# Patient Record
Sex: MALE | Race: WHITE | NOT HISPANIC OR LATINO | Employment: FULL TIME | URBAN - METROPOLITAN AREA
[De-identification: names, ages, dates, MRNs, and addresses within clinical notes are randomized per-mention and may not be internally consistent; named-entity substitution may affect disease eponyms.]

---

## 2022-11-08 ENCOUNTER — TELEPHONE (OUTPATIENT)
Dept: PHYSICAL MEDICINE AND REHAB | Facility: CLINIC | Age: 47
End: 2022-11-08

## 2022-11-08 NOTE — TELEPHONE ENCOUNTER
[facility]    Phone Message    May a detailed message be left on voicemail: yes     Reason for Call: Other: Pt called to schedule initail assessment with Dr. Lopez to discuss Botox or other possible treatmens for his Dystonia that effect his jaw movement.      Pt has been seen primarily at [facility] and on occasion [facility]. Pt was directed to Dr. Lopez via a support group he attends. He is thinking that Botox may be the answer, but is open to other care.     He did add that per his insurance, the Botox needs PA.    Per protocols, sending for clinic review.    Please review and call Pt back at [phone] to discuss and schedule appropriately.    Action Taken: Message routed to:  [facility] PM[facility]    Travel Screening: Not Applicable

## 2022-11-09 NOTE — TELEPHONE ENCOUNTER
Returned call to pt, offered him 2/7/23 at [place] since 2/10 was taken. Patient agreed and confirmed appt at [place],.    August

## 2022-11-09 NOTE — TELEPHONE ENCOUNTER
[facility]    Phone Message    May a detailed message be left on voicemail: yes     Reason for Call: Other: Pt is calling back returning a call about an appt with Dr. Lopez on 02/10/23 at 9:40 am. Pt says he can do that appt and would like to be scheduled for that date.     Action Taken: Message routed to:  [facility] PM&R    Travel Screening: Not Applicable

## 2022-11-09 NOTE — TELEPHONE ENCOUNTER
Returned a call to patient, had to leave a message, asked him to call back and let us know if he'd like to come in on Feb 10th at 9:40 or next available, as well as tootie first available with Dr Lopez.

## 2022-11-11 ENCOUNTER — TRANSCRIBE ORDERS (OUTPATIENT)
Dept: OTHER | Age: 47
End: 2022-11-11

## 2022-11-11 DIAGNOSIS — G24.4 OROMANDIBULAR DYSTONIA: Primary | ICD-10-CM

## 2023-02-07 ENCOUNTER — OFFICE VISIT (OUTPATIENT)
Dept: PHYSICAL MEDICINE AND REHAB | Facility: CLINIC | Age: 48
End: 2023-02-07
Payer: COMMERCIAL

## 2023-02-07 VITALS — HEART RATE: 61 BPM | SYSTOLIC BLOOD PRESSURE: 130 MMHG | DIASTOLIC BLOOD PRESSURE: 87 MMHG

## 2023-02-07 DIAGNOSIS — G24.4 OROMANDIBULAR DYSTONIA: ICD-10-CM

## 2023-02-07 DIAGNOSIS — G24.4 IDIOPATHIC OROFACIAL DYSTONIA: Primary | ICD-10-CM

## 2023-02-07 PROCEDURE — 99204 OFFICE O/P NEW MOD 45 MIN: CPT | Performed by: PHYSICAL MEDICINE & REHABILITATION

## 2023-02-07 RX ORDER — CLONAZEPAM 1 MG/1
2 TABLET ORAL AT BEDTIME
COMMUNITY
Start: 2022-11-01

## 2023-02-07 NOTE — LETTER
"    2023         RE: Jc Navarrete  [id number] [address] [address]        Dear Colleague,    Thank you for referring your patient, Jc Navarrete, to the [facility]. Please see a copy of my visit note below.        [facility]     PHYSICAL MEDICINE & REHABILITATION EVALUATION      PATIENT NAME Jc Navarrete   1975  MRN [id number]  REFERRING PROVIDER Self-referred    CHIEF COMPLAINT   Involuntary facial/jaw movements    HISTORY OF PRESENT ILLNESS  Jc Navarrete is a 47 year old male with a history of Tourette's syndrome and involuntary facial/jaw movements who presents to clinic for consideration of botulinum toxin injections and other potential modalities for management of the involuntary facial/jaw movements.     The patient states that he has a history of Tourette's syndrome, and developed tics around the age of 7. The tics were described as occasional eye blinking, head rotation, and throat clearing.  He was managed at the [facility] of neurolog[facility], and started on clonidine at one point.  Throughout his life, these tics have become seldom and very well managed to the point where they have not been a significant issue.  He occasionally gets the urge to turn his head, blink his eyes, or \"position things in a room\" with his eyes.     In 2022, the patient insidiously started to grind his front teeth together, which slowly became more frequent over time.  He then noticed that the teeth grinding was occurring more frequently during the day and night, which caused fatigue of his jaw musculature.  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 A few months later, however, his mandible started involuntarily pulling to the right side, and was " "associated with pain and clicking on the right side.  Symptoms progressed to the point that they are now where his mandible coni to the right at all times.  He must \"use half of my arm strength\" to hold the mandible in place with eating and talking.  If he does not hold his mandible in place with speaking, his speech is impaired with a lisp. This has become so tiring that he no longer does anything socially. He denies any significant life changes, stressors, or health changes around the time his symptoms developed - he notes that he did go through a traumatic divorce in 2015, but that he worked through this years ago.     He went to the dentist recently and was told that his front teeth have become flattened due to teeth grinding, and he also chipped part of his left front tooth from the grinding.  It was suggested that he use a mouthguard, however the patient had already tried an over-the-counter mouthguard which made him drool so much that he was not able to sleep.  He is reluctant to spend a significant amount of money on a custom mouthguard out of concern that he likely will not be able to tolerate it.      PAST MEDICAL HISTORY  Tourette's  Oromandibular dystonia    PAST SURGICAL HISTORY  No past surgical history on file.    PAST SOCIAL HISTORY  Social History     Socioeconomic History     Marital status: Single     Spouse name: Not on file     Number of children: Not on file     Years of education: Not on file     Highest education level: Not on file   Occupational History     Not on file   Tobacco Use     Smoking status: Never     Smokeless tobacco: Never   Substance and Sexual Activity     Alcohol use: Not on file     Drug use: Not on file     Sexual activity: Not on file   Other Topics Concern     Not on file   Social History Narrative     Not on file     Social Determinants of Health     Financial Resource Strain: Not on file   Food Insecurity: Not on file   Transportation Needs: Not on file   Physical " Activity: Not on file   Stress: Not on file   Social Connections: Not on file   Intimate Partner Violence: Not on file   Housing Stability: Not on file       FAMILY HISTORY  Daughter has a history of Tourette's syndrome      IMMUNIZATIONS    There is no immunization history on file for this patient.    ALLERGIES  Allergies   Allergen Reactions     Doxycycline Rash       MEDICATIONS  Current Outpatient Medications   Medication     clonazePAM (KLONOPIN) 1 MG tablet     No current facility-administered medications for this visit.       PHYSICAL EXAM                         There is no height or weight on file to calculate BMI.  GEN: Pleasant and cooperative, in no acute distress  HEENT: There is involuntary shearing of the mandible to the right, with palpable tension in the right masseter and right lateral pterygoid (likely compensatory).  Front incisors have lost the normal curvature, and there is a small chip on the left front incisor.      ASSESSMENT AND PLAN  Jc Navarrete is a 47 year old male with a history of Tourette's syndrome and involuntary facial/jaw movements who presents to clinic for consideration of botulinum toxin injections and other potential modalities for management of the involuntary facial/jaw movements.     The patient primarily exhibits involuntary mandibular shearing to the right, which is likely primarily due to activation of the left lateral pterygoid. The pain on the right side of his jaw is likely in the right lateral pterygoid and possibly masseters as compensatory response. EMG activity will be confirmatory.    The patient is an excellent candidate for a trial of botulinum toxin injections for management of oromandibular dystonia, which is gold standard treatment of choice for this condition.     Plan will be to request prior authorization from insurer for a maximum dose of 200 units of Botox every 12 weeks for the management of idiopathic oromandibular dystonia. He has been tentatively  scheduled for first series of injections pending insurance approval.       Thank you for this consultation. Please do not hesitate to contact me with further questions.   Johanna Lopez MD  Physical Medicine and Rehabilitation  [phone]      I spent a total of 46 minutes face-to-face and managing the care of Jc Navarrete. Over 50% of my time was spent counseling the patient and coordinating care. Please see note for details.                   Again, thank you for allowing me to participate in the care of your patient.        Sincerely,        Johanna Lopez MD

## 2023-02-07 NOTE — PROGRESS NOTES
"    [facility]     PHYSICAL MEDICINE & REHABILITATION EVALUATION      PATIENT NAME Jc Navarrete   1975  MRN [id number]  REFERRING PROVIDER Self-referred    CHIEF COMPLAINT   Involuntary facial/jaw movements    HISTORY OF PRESENT ILLNESS  Jc Navarrete is a 47 year old male with a history of Tourette's syndrome and involuntary facial/jaw movements who presents to clinic for consideration of botulinum toxin injections and other potential modalities for management of the involuntary facial/jaw movements.     The patient states that he has a history of Tourette's syndrome, and developed tics around the age of 7. The tics were described as occasional eye blinking, head rotation, and throat clearing.  He was managed at the [facility], and started on clonidine at one point.  Throughout his life, these tics have become seldom and very well managed to the point where they have not been a significant issue.  He occasionally gets the urge to turn his head, blink his eyes, or \"position things in a room\" with his eyes.     In 2022, the patient insidiously started to grind his front teeth together, which slowly became more frequent over time.  He then noticed that the teeth grinding was occurring more frequently during the day and night, which caused fatigue of his jaw musculature.  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 A few months later, however, his mandible started involuntarily pulling to the right side, and was associated with pain and clicking on the right side.  Symptoms progressed to the point that they are now where his mandible coni to the right at all times.  He must \"use half of my arm strength\" to hold the mandible in place with eating and talking.  If he does " not hold his mandible in place with speaking, his speech is impaired with a lisp. This has become so tiring that he no longer does anything socially. He denies any significant life changes, stressors, or health changes around the time his symptoms developed - he notes that he did go through a traumatic divorce in 2015, but that he worked through this years ago.     He went to the dentist recently and was told that his front teeth have become flattened due to teeth grinding, and he also chipped part of his left front tooth from the grinding.  It was suggested that he use a mouthguard, however the patient had already tried an over-the-counter mouthguard which made him drool so much that he was not able to sleep.  He is reluctant to spend a significant amount of money on a custom mouthguard out of concern that he likely will not be able to tolerate it.      PAST MEDICAL HISTORY  Tourette's  Oromandibular dystonia    PAST SURGICAL HISTORY  No past surgical history on file.    PAST SOCIAL HISTORY  Social History     Socioeconomic History     Marital status: Single     Spouse name: Not on file     Number of children: Not on file     Years of education: Not on file     Highest education level: Not on file   Occupational History     Not on file   Tobacco Use     Smoking status: Never     Smokeless tobacco: Never   Substance and Sexual Activity     Alcohol use: Not on file     Drug use: Not on file     Sexual activity: Not on file   Other Topics Concern     Not on file   Social History Narrative     Not on file     Social Determinants of Health     Financial Resource Strain: Not on file   Food Insecurity: Not on file   Transportation Needs: Not on file   Physical Activity: Not on file   Stress: Not on file   Social Connections: Not on file   Intimate Partner Violence: Not on file   Housing Stability: Not on file       FAMILY HISTORY  Daughter has a history of Tourette's syndrome      IMMUNIZATIONS    There is no immunization  history on file for this patient.    ALLERGIES  Allergies   Allergen Reactions     Doxycycline Rash       MEDICATIONS  Current Outpatient Medications   Medication     clonazePAM (KLONOPIN) 1 MG tablet     No current facility-administered medications for this visit.       PHYSICAL EXAM                         There is no height or weight on file to calculate BMI.  GEN: Pleasant and cooperative, in no acute distress  HEENT: There is involuntary shearing of the mandible to the right, with palpable tension in the right masseter and right lateral pterygoid (likely compensatory).  Front incisors have lost the normal curvature, and there is a small chip on the left front incisor.      ASSESSMENT AND PLAN  Jc Navarrete is a 47 year old male with a history of Tourette's syndrome and involuntary facial/jaw movements who presents to clinic for consideration of botulinum toxin injections and other potential modalities for management of the involuntary facial/jaw movements.     The patient primarily exhibits involuntary mandibular shearing to the right, which is likely primarily due to activation of the left lateral pterygoid. The pain on the right side of his jaw is likely in the right lateral pterygoid and possibly masseters as compensatory response. EMG activity will be confirmatory.    The patient is an excellent candidate for a trial of botulinum toxin injections for management of oromandibular dystonia, which is gold standard treatment of choice for this condition.     Plan will be to request prior authorization from insurer for a maximum dose of 200 units of Botox every 12 weeks for the management of idiopathic oromandibular dystonia. He has been tentatively scheduled for first series of injections pending insurance approval.       Thank you for this consultation. Please do not hesitate to contact me with further questions.   Johanna Lopez MD  Physical Medicine and Rehabilitation  [phone]      I spent a total  of 46 minutes face-to-face and managing the care of Jc Navarrete. Over 50% of my time was spent counseling the patient and coordinating care. Please see note for details.

## 2023-02-07 NOTE — NURSING NOTE
Chief Complaint   Patient presents with     Oromandibular dystonia     consult       JOJO Diez on 2/7/2023 at 10:41 AM

## 2023-02-09 ENCOUNTER — TELEPHONE (OUTPATIENT)
Dept: PHYSICAL MEDICINE AND REHAB | Facility: CLINIC | Age: 48
End: 2023-02-09
Payer: COMMERCIAL

## 2023-02-09 NOTE — TELEPHONE ENCOUNTER
Per consult note of 2/7/23:    Plan will be to request prior authorization from insurer for a maximum dose of 200 units of Botox every 12 weeks for the management of idiopathic oromandibular dystonia.    Called to Jc, he has also talked with the Financial counselor (Gwendolyn Cosme ) at the end of last year before he had the consult with Dr Lopez.  He was told cost is $18 - 20 per unit of botox.    Reviewed Froylan's specific situation,  told him only one muscle on the right side would likely be injected, so his dose will likely be in the neighborhood of up to 40 or 50 units botox for a first injection.     Reviewed the procedure again regarding using EMG needle and Dr's assessment during procedure to determine specific dosing, and that initiating doses are low and can be increased in future injection visits after we learn a pt's specific response to the neurotoxin.    Typical action of neurotoxin provides 10 to 12 weeks of muscle tone relief, yet results are individual and in some cases pts experience the positive effects beyond 12 weeks, so he will need to note his specific response and discuss dosing at each visit.    Jc anticipates his annual deductible of $1500 could be used for the first visit and his future visits are paid at 85% after deductible is met.  He is budgeting for the year and wanted to know [employer] figures.    Writer let him know that bill will include professional fees as well as the neurotoxin charges.    We will keep Jc updated as we learn of his specific PA process, and writer encouraged him to re connect with Finance counselor as needed to answer policy specific questions.    Jc has appt on 2/28/23 for injections, pending insurance authorization.    Johanna Norwood RN on 2/9/2023 at 5:24 PM

## 2023-02-09 NOTE — TELEPHONE ENCOUNTER
[facility]    Phone Message    May a detailed message be left on voicemail: yes     Reason for Call: Other: Patient is requesting a call back regarding how many units of botox he will be having every 3 months moving forward. Please call pt back to advise at # [phone]     Action Taken: Message routed to:  [facility] PM&R    Travel Screening: Not Applicable

## 2023-04-04 ENCOUNTER — CARE COORDINATION (OUTPATIENT)
Dept: PHYSICAL MEDICINE AND REHAB | Facility: CLINIC | Age: 48
End: 2023-04-04
Payer: COMMERCIAL

## 2024-01-02 ENCOUNTER — TELEPHONE (OUTPATIENT)
Dept: PHYSICAL MEDICINE AND REHAB | Facility: CLINIC | Age: 49
End: 2024-01-02
Payer: COMMERCIAL

## 2024-01-02 NOTE — TELEPHONE ENCOUNTER
Reached out to pt, had to LVM stated if he could call back and schedule first available appt with Dr Lopez, and please update his new insurance info. Once appt is scheduled PA will be submitted to determine coverage for Botox injection.

## 2024-01-02 NOTE — TELEPHONE ENCOUNTER
[facility]    Phone Message    May a detailed message be left on voicemail: yes     Reason for Call: Patient requests a call back to discuss botox appointment and eligibility. Insurance plan changed.   Please return call.    Patient said he has better plan.     Action Taken: PM&R    Travel Screening: Not Applicable

## 2024-01-02 NOTE — TELEPHONE ENCOUNTER
[facility]    Phone Message    May a detailed message be left on voicemail: yes     Reason for Call: Other: Jc is scheduled for 5/29/23 for his neurotoxin appointment and has been added to the wait list. Jc is requesting either a phone call or an email for the prior authorization results.     Action Taken: Message routed to:  [facility]    Travel Screening: Not Applicable

## 2024-03-05 ENCOUNTER — TELEPHONE (OUTPATIENT)
Dept: PHYSICAL MEDICINE AND REHAB | Facility: CLINIC | Age: 49
End: 2024-03-05
Payer: COMMERCIAL

## 2024-03-05 NOTE — TELEPHONE ENCOUNTER
[facility]    Phone Message    May a detailed message be left on voicemail: yes     Reason for Call: Other: Pt is calling and stating that he would like to know if  his insurance is going to cover his botox. Please call pt back to discuss. Pt is stating that he has contact information for his [employer] for PA.    Action Taken: Message routed to:  [facility] PMR    Travel Screening: Not Applicable

## 2024-03-13 DIAGNOSIS — G24.4 IDIOPATHIC OROFACIAL DYSTONIA: Primary | ICD-10-CM

## 2024-04-03 DIAGNOSIS — G24.4 IDIOPATHIC OROFACIAL DYSTONIA: Primary | ICD-10-CM

## 2024-04-12 DIAGNOSIS — G24.4 IDIOPATHIC OROFACIAL DYSTONIA: Primary | ICD-10-CM

## 2024-05-06 ENCOUNTER — TELEPHONE (OUTPATIENT)
Dept: PHYSICAL MEDICINE AND REHAB | Facility: CLINIC | Age: 49
End: 2024-05-06
Payer: COMMERCIAL

## 2024-05-06 NOTE — TELEPHONE ENCOUNTER
Writer returned call to patient to talk with him about the question he has about his Botox approval from insurance company. Patient given writers information with telephone number for return call to clinic. Shanna Fermin RN on 5/6/2024 at 3:16 PM

## 2024-05-06 NOTE — TELEPHONE ENCOUNTER
[facility]    Phone Message    May a detailed message be left on voicemail: yes     Reason for Call: Other: Patient is returning a call to Shanna. Please call back when available.      Action Taken: Other: PMR    Travel Screening: Not Applicable

## 2024-05-06 NOTE — TELEPHONE ENCOUNTER
[facility]    Phone Message    May a detailed message be left on voicemail: yes     Reason for Call: Other: Patient has questions regarding his amount of botox that was approved. Last year he was approved for 120 units and this year its listed as 100 units and he wanted to know why. Please call back when available.      Action Taken: Other: PMR    Travel Screening: Not Applicable

## 2024-05-07 NOTE — TELEPHONE ENCOUNTER
[facility]    Phone Message    May a detailed message be left on voicemail: yes     Reason for Call: Other: Pt is returning call and would like a call back.     Action Taken: Message routed to:  [facility] PMR    Travel Screening: Not Applicable

## 2024-05-07 NOTE — TELEPHONE ENCOUNTER
Writer returned call to patient to discuss the insurance coverage for Botox. Patient states previous insurance company approved a greater dose than his current insurance plan covers and patient wanted to understand the rationale. Writer provided a general explanation of insurance coverage for dx. Writer expressed understanding and had no further questions. Shanna Fermin RN on 5/7/2024 at 1:02 PM

## 2024-05-29 ENCOUNTER — OFFICE VISIT (OUTPATIENT)
Dept: PHYSICAL MEDICINE AND REHAB | Facility: CLINIC | Age: 49
End: 2024-05-29
Payer: COMMERCIAL

## 2024-05-29 VITALS
RESPIRATION RATE: 16 BRPM | OXYGEN SATURATION: 100 % | SYSTOLIC BLOOD PRESSURE: 122 MMHG | DIASTOLIC BLOOD PRESSURE: 78 MMHG | HEART RATE: 50 BPM

## 2024-05-29 DIAGNOSIS — G24.4 IDIOPATHIC OROFACIAL DYSTONIA: Primary | ICD-10-CM

## 2024-05-29 PROCEDURE — 64612 DESTROY NERVE FACE MUSCLE: CPT | Mod: 50 | Performed by: PHYSICAL MEDICINE & REHABILITATION

## 2024-05-29 PROCEDURE — 95874 GUIDE NERV DESTR NEEDLE EMG: CPT | Performed by: PHYSICAL MEDICINE & REHABILITATION

## 2024-05-29 ASSESSMENT — PAIN SCALES - GENERAL: PAINLEVEL: NO PAIN (0)

## 2024-05-29 NOTE — LETTER
5/29/2024       RE: Jc Navarrete  [id number] [facility] [address]     Dear Colleague,    Thank you for referring your patient, Jc Navarrete, to the [facility] PHYSICAL MEDICINE AND REHABILITATION CLINIC [place] at [facility]. Please see a copy of my visit note below.      [facility] [place] - CLINICS & SURGERY CENTER    PM&R CLINIC NOTE  BOTULINUM TOXIN PROCEDURE      HPI  Chief Complaint   Patient presents with    Procedure     Botox     Jc Navarrete is a 48 year old male with a history of Tourette's syndrome and involuntary facial/jaw movements who presents to clinic for botulinum toxin injections for management of the involuntary facial/jaw movements.     SINCE LAST VISIT  Jc Navarrete was last seen here in clinic on 2/7/2024 for his initial consultation, at which time he was felt to be a good candidate for Botox.     Patient denies new medical diagnoses, illnesses, hospitalizations, emergency room visits, and injuries since his last visit.     RESPONSE TO PREVIOUS TREATMENT    N/A - Initial treatment.       PHYSICAL EXAM  VS: /78   Pulse 50   Resp 16   SpO2 100%    GEN: Pleasant and cooperative, in no acute distress  HEENT: There is involuntary shearing of the mandible to the right, with palpable tension in the right masseter and right lateral pterygoid (likely compensatory).  Front incisors have lost the normal curvature, and there is a small chip on the left front incisor.    ALLERGIES  Allergies   Allergen Reactions    Doxycycline Rash       CURRENT MEDICATIONS    Current Outpatient Medications:     clonazePAM (KLONOPIN) 1 MG tablet, Take 2 mg by mouth at bedtime, Disp: , Rfl:     Current Facility-Administered Medications:     botulinum toxin type A (BOTOX) 100 units injection 100 Units, 100 Units, Intramuscular, Q12 weeks,        BOTULINUM NEUROTOXIN INJECTION  PROCEDURES    VERIFICATION OF PATIENT IDENTIFICATION AND PROCEDURE     Initials   Patient Name SES   Patient  SES   Procedure Verified by: SES     Prior to the start of the procedure and with procedural staff participation, I verbally confirmed the patient s identity using two indicators, relevant allergies, that the procedure was appropriate and matched the consent or emergent situation, and that the correct equipment/implants were available. Immediately prior to starting the procedure I conducted the Time Out with the procedural staff and re-confirmed the patient s name, procedure, and site/side. 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 His baseline symptoms have been recalcitrant to oral medications and conservative therapy.  He is here today for injection with Botox.    GOAL OF PROCEDURE  The goal of this procedure is to increase active range of motion, improve volitional motor control, and decrease pain.      TOTAL DOSE ADMINISTERED  Dose Administered:  30 units  Botox (Botulinum Toxin Type A)       1:1 Dilution   Unavoidable Drug Waste: Yes  Amount of drug waste (mL): 70 units Botox.  Reason for waste:  Single use vial  Diluent Used:  Preservative Free Normal Saline  Total Volume of Diluent Used:  0.3 ml  NDC #: Botox 100u ([address]-68)      CONSENT  The risks, benefits, and treatment options were discussed with Jc Navarrete and he agreed to proceed.    Written consent was obtained by [facility].     EQUIPMENT USED  Needle-25mm stimulating/recording  EMG/NCS Machine    SKIN PREPARATION  Skin preparation was performed using an alcohol wipe.    GUIDANCE DESCRIPTION  Electro-myographic guidance was necessary throughout the procedure  to accurately identify all areas of dystonic muscles while avoiding injection of non-dystonic muscles, neighboring nerves and nearby vascular structures.       AREA/MUSCLE INJECTED: 30 UNITS BOTOX = TOTAL DOSE, 1:1 DILUTION  1. JAW MUSCLES: 30 UNITS BOTOX = TOTAL DOSE  Right Lateral Pterygoid - 5 units of Botox at 1 site/s.   Left Lateral Pterygoid- 25 units of Botox at 1 site/s.    RESPONSE TO PROCEDURE  Jc Navarrete tolerated the procedure well and there were no immediate complications. He was allowed to recover for an appropriate period of time and was discharged home in stable condition.    ASSESSMENT AND PLAN   Botulinum toxin injections: Initial injections of Botox administered today, with an exceptionally conservative dose of 30 units given. Patient will monitor response and report at next appointment, at which time we will discuss effects and modify dose/distribution as needed.    Referrals: None.   Follow up: Jc Navarrete was rescheduled for the next series of injections in 12 weeks, at which time we will evaluate response to today's injections. He may call the clinic prior with any questions or concerns prior to the next appointment.         Again, thank you for allowing me to participate in the care of your patient.      Sincerely,    Johanna Lopez MD

## 2024-05-29 NOTE — PROGRESS NOTES
[facility] CLINICS & SURGERY CENTER    PM&R CLINIC NOTE  BOTULINUM TOXIN PROCEDURE      HPI  Chief Complaint   Patient presents with    Procedure     Botox     Jc Navarrete is a 48 year old male with a history of Tourette's syndrome and involuntary facial/jaw movements who presents to clinic for botulinum toxin injections for management of the involuntary facial/jaw movements.     SINCE LAST VISIT  Jc Navarrete was last seen here in clinic on 2024 for his initial consultation, at which time he was felt to be a good candidate for Botox.     Patient denies new medical diagnoses, illnesses, hospitalizations, emergency room visits, and injuries since his last visit.     RESPONSE TO PREVIOUS TREATMENT    N/A - Initial treatment.       PHYSICAL EXAM  VS: /78   Pulse 50   Resp 16   SpO2 100%    GEN: Pleasant and cooperative, in no acute distress  HEENT: There is involuntary shearing of the mandible to the right, with palpable tension in the right masseter and right lateral pterygoid (likely compensatory).  Front incisors have lost the normal curvature, and there is a small chip on the left front incisor.    ALLERGIES  Allergies   Allergen Reactions    Doxycycline Rash       CURRENT MEDICATIONS    Current Outpatient Medications:     clonazePAM (KLONOPIN) 1 MG tablet, Take 2 mg by mouth at bedtime, Disp: , Rfl:     Current Facility-Administered Medications:     botulinum toxin type A (BOTOX) 100 units injection 100 Units, 100 Units, Intramuscular, Q12 weeks,        BOTULINUM NEUROTOXIN INJECTION PROCEDURES    VERIFICATION OF PATIENT IDENTIFICATION AND PROCEDURE     Initials   Patient Name SES   Patient  SES   Procedure Verified by: SES     Prior to the start of the procedure and with procedural staff participation, I verbally confirmed the patient s identity using two indicators, relevant allergies, that the procedure was appropriate and matched  the consent or emergent situation, and that the correct equipment/implants were available. Immediately prior to starting the procedure I conducted the Time Out with the procedural staff and re-confirmed the patient s name, procedure, and site/side. 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 His baseline symptoms have been recalcitrant to oral medications and conservative therapy.  He is here today for injection with Botox.    GOAL OF PROCEDURE  The goal of this procedure is to increase active range of motion, improve volitional motor control, and decrease pain.      TOTAL DOSE ADMINISTERED  Dose Administered:  30 units  Botox (Botulinum Toxin Type A)       1:1 Dilution   Unavoidable Drug Waste: Yes  Amount of drug waste (mL): 70 units Botox.  Reason for waste:  Single use vial  Diluent Used:  Preservative Free Normal Saline  Total Volume of Diluent Used:  0.3 ml  NDC #: Botox 100u ([address]-86)      CONSENT  The risks, benefits, and treatment options were discussed with Jc Navarrete and he agreed to proceed.    Written consent was obtained by [facility].     EQUIPMENT USED  Needle-25mm stimulating/recording  EMG/NCS Machine    SKIN PREPARATION  Skin preparation was performed using an alcohol wipe.    GUIDANCE DESCRIPTION  Electro-myographic guidance was necessary throughout the procedure to accurately identify all areas of dystonic muscles while avoiding injection of non-dystonic muscles, neighboring nerves and nearby vascular structures.       AREA/MUSCLE INJECTED: 30 UNITS BOTOX = TOTAL DOSE, 1:1 DILUTION  1. JAW MUSCLES: 30 UNITS BOTOX = TOTAL DOSE  Right Lateral Pterygoid - 5 units of Botox at 1 site/s.   Left Lateral Pterygoid- 25 units  of Botox at 1 site/s.    RESPONSE TO PROCEDURE  Jc Navarrete tolerated the procedure well and there were no immediate complications. He was allowed to recover for an appropriate period of time and was discharged home in stable condition.    ASSESSMENT AND PLAN   Botulinum toxin injections: Initial injections of Botox administered today, with an exceptionally conservative dose of 30 units given. Patient will monitor response and report at next appointment, at which time we will discuss effects and modify dose/distribution as needed.    Referrals: None.   Follow up: Jc Navarrete was rescheduled for the next series of injections in 12 weeks, at which time we will evaluate response to today's injections. He may call the clinic prior with any questions or concerns prior to the next appointment.

## 2024-08-21 ENCOUNTER — OFFICE VISIT (OUTPATIENT)
Dept: PHYSICAL MEDICINE AND REHAB | Facility: CLINIC | Age: 49
End: 2024-08-21
Payer: COMMERCIAL

## 2024-08-21 VITALS
HEART RATE: 58 BPM | DIASTOLIC BLOOD PRESSURE: 79 MMHG | RESPIRATION RATE: 16 BRPM | SYSTOLIC BLOOD PRESSURE: 120 MMHG | OXYGEN SATURATION: 97 %

## 2024-08-21 DIAGNOSIS — G24.4 IDIOPATHIC OROFACIAL DYSTONIA: Primary | ICD-10-CM

## 2024-08-21 PROCEDURE — 64612 DESTROY NERVE FACE MUSCLE: CPT | Mod: LT | Performed by: PHYSICAL MEDICINE & REHABILITATION

## 2024-08-21 PROCEDURE — 95874 GUIDE NERV DESTR NEEDLE EMG: CPT | Performed by: PHYSICAL MEDICINE & REHABILITATION

## 2024-08-21 ASSESSMENT — PAIN SCALES - GENERAL: PAINLEVEL: NO PAIN (0)

## 2024-08-21 NOTE — LETTER
8/21/2024       RE: Jc Navarrete  [address] [facility] [address]     Dear Colleague,    Thank you for referring your patient, Jc Navarrete, to the [facility] PHYSICAL MEDICINE AND REHABILITATION CLINIC [place] at [facility]. Please see a copy of my visit note below.      [facility] - CLINICS & SURGERY CENTER    PM&R CLINIC NOTE  BOTULINUM TOXIN PROCEDURE      HPI  No chief complaint on file.    Jc Navarrete is a 48 year old male with a history of Tourette's syndrome and involuntary facial/jaw movements who presents to clinic for botulinum toxin injections for management of the involuntary facial/jaw movements.     SINCE LAST VISIT  Jc Navarrete was last seen here in clinic on 5/29/2024, at which time he received 30 units of Botox. This was his first round of Botox.     Patient denies new medical diagnoses, illnesses, hospitalizations, emergency room visits, and injuries since his last visit.     RESPONSE TO PREVIOUS TREATMENT    Side effects: No problems reported    Pain Improvement: He only endorses pain on the right side of his jaw, localized to the right masseter and lateral pterygoid region. The pain seems to be worse with prolonged periods of talking, as that side has to compensate for the involuntary shifting of the mandible to the right side.     Dystonia Improvement: There was very minimal benefit noted throughout the injection cycle.       PHYSICAL EXAM  VS: There were no vitals taken for this visit.   GEN: Pleasant and cooperative, in no acute distress  HEENT: There is involuntary shearing of the mandible to the right, with palpable tension in the right masseter and right lateral pterygoid (likely compensatory).  Front incisors have lost the normal curvature, and there is a small chip on the left front incisor.    ALLERGIES  Allergies   Allergen Reactions     Doxycycline  Rash       CURRENT MEDICATIONS    Current Outpatient Medications:      clonazePAM (KLONOPIN) 1 MG tablet, Take 2 mg by mouth at bedtime, Disp: , Rfl:     Current Facility-Administered Medications:      botulinum toxin type A (BOTOX) 100 units injection 100 Units, 100 Units, Intramuscular, Q12 weeks, , 100 Units at 24 0925       BOTULINUM NEUROTOXIN INJECTION PROCEDURES    VERIFICATION OF PATIENT IDENTIFICATION AND PROCEDURE     Initials   Patient Name SES   Patient  SES   Procedure Verified by: SES     Prior to the start of the procedure and with procedural staff participation, I verbally confirmed the patient s identity using two indicators, relevant allergies, that the procedure was appropriate and matched the consent or emergent situation, and that the correct equipment/implants were available. Immediately prior to starting the procedure I conducted the Time Out with the procedural staff and re-confirmed the patient s name, procedure, and site/side. 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 His baseline symptoms have been recalcitrant to oral medications and conservative therapy.  He is here today for injection with Botox.    GOAL OF PROCEDURE  The goal of this procedure is to increase active range of motion, improve volitional motor control, and decrease pain.      TOTAL DOSE ADMINISTERED  Dose Administered:  50 units  Botox (Botulinum Toxin Type A)       1:1 Dilution   Unavoidable Drug Waste: Yes  Amount of drug waste (mL): 50 units Botox.  Reason for waste:  Single use vial  Diluent Used:  Preservative Free Normal Saline  Total Volume of Diluent Used:  0.5 ml  NDC #: Botox 100u ([address]-20)      CONSENT  The risks,  benefits, and treatment options were discussed with Jc Navarrete and he agreed to proceed.    Written consent was obtained by [facility].     EQUIPMENT USED  Needle-25mm stimulating/recording  EMG/NCS Machine    SKIN PREPARATION  Skin preparation was performed using an alcohol wipe.    GUIDANCE DESCRIPTION  Electro-myographic guidance was necessary throughout the procedure to accurately identify all areas of dystonic muscles while avoiding injection of non-dystonic muscles, neighboring nerves and nearby vascular structures.       AREA/MUSCLE INJECTED: 50 UNITS BOTOX = TOTAL DOSE, 1:1 DILUTION  1. JAW MUSCLES: 50 UNITS BOTOX = TOTAL DOSE  Left Lateral Pterygoid - 50 units of Botox at 1 site/s.    RESPONSE TO PROCEDURE  Jc Navarrete tolerated the procedure well and there were no immediate complications. He was allowed to recover for an appropriate period of time and was discharged home in stable condition.    ASSESSMENT AND PLAN   Botulinum toxin injections: Dose of Botox was increased from 30 units to 50 units in hopes of increasing effectiveness and prolonging duration of benefit of injections. We used EMG guidance to listen to the activity in the right lateral pterygoid, but there was minimal activity, and therefore we did not include this muscle today. He will monitor his response to the injections and we will modify based on response at his next appointment.   Referrals: None.   Follow up: Jc Navarrete was rescheduled for the next series of injections in 12 weeks, at which time we will evaluate response to today's injections. He may call the clinic prior with any questions or concerns prior to the next appointment.       Johanna Lopez MD       Again, thank you for allowing me to participate in the care of your patient.      Sincerely,    Johanna Lopez MD

## 2024-08-21 NOTE — PROGRESS NOTES
[facility] & SURGERY CENTER    PM&R CLINIC NOTE  BOTULINUM TOXIN PROCEDURE      HPI  No chief complaint on file.    Jc Navarrete is a 48 year old male with a history of Tourette's syndrome and involuntary facial/jaw movements who presents to clinic for botulinum toxin injections for management of the involuntary facial/jaw movements.     SINCE LAST VISIT  Jc Navarrete was last seen here in clinic on 5/29/2024, at which time he received 30 units of Botox. This was his first round of Botox.     Patient denies new medical diagnoses, illnesses, hospitalizations, emergency room visits, and injuries since his last visit.     RESPONSE TO PREVIOUS TREATMENT    Side effects: No problems reported    Pain Improvement: He only endorses pain on the right side of his jaw, localized to the right masseter and lateral pterygoid region. The pain seems to be worse with prolonged periods of talking, as that side has to compensate for the involuntary shifting of the mandible to the right side.     Dystonia Improvement: There was very minimal benefit noted throughout the injection cycle.       PHYSICAL EXAM  VS: There were no vitals taken for this visit.   GEN: Pleasant and cooperative, in no acute distress  HEENT: There is involuntary shearing of the mandible to the right, with palpable tension in the right masseter and right lateral pterygoid (likely compensatory).  Front incisors have lost the normal curvature, and there is a small chip on the left front incisor.    ALLERGIES  Allergies   Allergen Reactions    Doxycycline Rash       CURRENT MEDICATIONS    Current Outpatient Medications:     clonazePAM (KLONOPIN) 1 MG tablet, Take 2 mg by mouth at bedtime, Disp: , Rfl:     Current Facility-Administered Medications:     botulinum toxin type A (BOTOX) 100 units injection 100 Units, 100 Units, Intramuscular, Q12 weeks, , 100 Units at 05/29/24 0959       BOTULINUM  NEUROTOXIN INJECTION PROCEDURES    VERIFICATION OF PATIENT IDENTIFICATION AND PROCEDURE     Initials   Patient Name SES   Patient  SES   Procedure Verified by: SES     Prior to the start of the procedure and with procedural staff participation, I verbally confirmed the patient s identity using two indicators, relevant allergies, that the procedure was appropriate and matched the consent or emergent situation, and that the correct equipment/implants were available. Immediately prior to starting the procedure I conducted the Time Out with the procedural staff and re-confirmed the patient s name, procedure, and site/side. 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 His baseline symptoms have been recalcitrant to oral medications and conservative therapy.  He is here today for injection with Botox.    GOAL OF PROCEDURE  The goal of this procedure is to increase active range of motion, improve volitional motor control, and decrease pain.      TOTAL DOSE ADMINISTERED  Dose Administered:  50 units  Botox (Botulinum Toxin Type A)       1:1 Dilution   Unavoidable Drug Waste: Yes  Amount of drug waste (mL): 50 units Botox.  Reason for waste:  Single use vial  Diluent Used:  Preservative Free Normal Saline  Total Volume of Diluent Used:  0.5 ml  NDC #: Botox 100u ([address]-01)      CONSENT  The risks, benefits, and treatment options were discussed with Jc Navarrete and he agreed to proceed.    Written consent was obtained by [facility].     EQUIPMENT USED  Needle-25mm stimulating/recording  EMG/NCS Machine    SKIN PREPARATION  Skin preparation was performed using an alcohol wipe.    GUIDANCE DESCRIPTION  Electro-myographic guidance was necessary  throughout the procedure to accurately identify all areas of dystonic muscles while avoiding injection of non-dystonic muscles, neighboring nerves and nearby vascular structures.       AREA/MUSCLE INJECTED: 50 UNITS BOTOX = TOTAL DOSE, 1:1 DILUTION  1. JAW MUSCLES: 50 UNITS BOTOX = TOTAL DOSE  Left Lateral Pterygoid - 50 units of Botox at 1 site/s.    RESPONSE TO PROCEDURE  Jc Navarrete tolerated the procedure well and there were no immediate complications. He was allowed to recover for an appropriate period of time and was discharged home in stable condition.    ASSESSMENT AND PLAN   Botulinum toxin injections: Dose of Botox was increased from 30 units to 50 units in hopes of increasing effectiveness and prolonging duration of benefit of injections. We used EMG guidance to listen to the activity in the right lateral pterygoid, but there was minimal activity, and therefore we did not include this muscle today. He will monitor his response to the injections and we will modify based on response at his next appointment.   Referrals: None.   Follow up: Jc Navarrete was rescheduled for the next series of injections in 12 weeks, at which time we will evaluate response to today's injections. He may call the clinic prior with any questions or concerns prior to the next appointment.       Johanna Lopez MD

## 2024-11-13 ENCOUNTER — OFFICE VISIT (OUTPATIENT)
Dept: PHYSICAL MEDICINE AND REHAB | Facility: CLINIC | Age: 49
End: 2024-11-13
Payer: COMMERCIAL

## 2024-11-13 DIAGNOSIS — G24.4 IDIOPATHIC OROFACIAL DYSTONIA: Primary | ICD-10-CM

## 2024-11-13 PROCEDURE — 64612 DESTROY NERVE FACE MUSCLE: CPT | Mod: LT | Performed by: PHYSICAL MEDICINE & REHABILITATION

## 2024-11-13 PROCEDURE — 95874 GUIDE NERV DESTR NEEDLE EMG: CPT | Performed by: PHYSICAL MEDICINE & REHABILITATION

## 2024-11-13 NOTE — LETTER
11/13/2024       RE: Jc Navarrete  [address] [facility] [address]     Dear Colleague,    Thank you for referring your patient, Jc Navarrete, to the [facility] PHYS[facility] MEDICINE AND REHABILITATION CLINIC [place] at [facility]. Please see a copy of my visit note below.      [facility] - CLINICS & SURGERY CENTER    PM&R CLINIC NOTE  BOTULINUM TOXIN PROCEDURE      HPI  No chief complaint on file.    Jc Navarrete is a 49 year old male with a history of Tourette's syndrome and involuntary facial/jaw movements who presents to clinic for botulinum toxin injections for management of the involuntary facial/jaw movements.     SINCE LAST VISIT  Jc Navarrete was last seen here in clinic on 8/21/2024, at which time he received 50 units of Botox, which was an increase from previous dose of 30 units of Botox.    Patient denies new medical diagnoses, illnesses, hospitalizations, emergency room visits, and injuries since the previous injection with botulinum neurotoxin.     He has been taking clonazepam 0.5 mg bid (once in the morning and once at 1 pm), and this seems to help take the edge off of the jaw muscle tightness. He reports that at one point, he was on up to 3 mg of clonazepam per day, but eventually went on it, and only recently started back up on the medication with good effect. He would like to try to keep the dose of this medication as low as possible. He can feel the effects of the clonazepam when he takes it, and finds that it helps. He does not have a history of dependence or tolerance to this medication.     RESPONSE TO PREVIOUS TREATMENT    Side effects:  He noticed fatigue with chewing after about 3 weeks, and then this went on for a few weeks and then resolved.    Pain Improvement: He only endorses pain on the right side of his jaw, localized to the right masseter and lateral  pterygoid region. The pain seems to be worse with prolonged periods of talking, as that side has to compensate for the involuntary shifting of the mandible to the right side.     Dystonia Improvement: He believes there was about 30% reduction in the involuntary shifting of his mandible to the right, but the effects were short-lived.       PHYSICAL EXAM  VS: There were no vitals taken for this visit.   GEN: Pleasant and cooperative, in no acute distress  HEENT: There is involuntary shearing of the mandible to the right, with palpable tension in the right masseter and right lateral pterygoid (likely compensatory).  Front incisors have lost the normal curvature, and there is a small chip on the left front incisor.    ALLERGIES  Allergies   Allergen Reactions     Doxycycline Rash       CURRENT MEDICATIONS    Current Outpatient Medications:      clonazePAM (KLONOPIN) 1 MG tablet, Take 2 mg by mouth at bedtime, Disp: , Rfl:     Current Facility-Administered Medications:      botulinum toxin type A (BOTOX) 100 units injection 100 Units, 100 Units, Intramuscular, Q12 weeks, , 50 Units at 24 2794       BOTULINUM NEUROTOXIN INJECTION PROCEDURES    VERIFICATION OF PATIENT IDENTIFICATION AND PROCEDURE     Initials   Patient Name SES   Patient  SES   Procedure Verified by: SES     Prior to the start of the procedure and with procedural staff participation, I verbally confirmed the patient s identity using two indicators, relevant allergies, that the procedure was appropriate and matched the consent or emergent situation, and that the correct equipment/implants were available. Immediately prior to starting the procedure I conducted the Time Out with the procedural staff and re-confirmed the patient s name, procedure, and site/side. 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 His baseline symptoms have been recalcitrant to oral medications and conservative therapy.  He is here today for injection with Botox.    GOAL OF PROCEDURE  The goal of this procedure is to increase active range of motion, improve volitional motor control, and decrease pain.      TOTAL DOSE ADMINISTERED  Dose Administered:  90 units  Botox (Botulinum Toxin Type A)    Unavoidable Drug Waste: Yes  Amount of drug waste (mL): 10 units Botox.  Reason for waste:  Single use vial  Diluent Used:  Preservative Free Normal Saline  Total Volume of Diluent Used:  0.5 ml (100 units diluted into 0.5 ml)  NDC #: Botox 100u ([address]-65)      CONSENT  The risks, benefits, and treatment options were discussed with Jc Navarrete and he agreed to proceed.    Written consent was obtained by [facility].     EQUIPMENT USED  Needle-25mm stimulating/recording  EMG/NCS Machine    SKIN PREPARATION  Skin preparation was performed using an alcohol wipe.    GUIDANCE DESCRIPTION  Electro-myographic guidance was necessary throughout the procedure to accurately identify all areas of dystonic muscles while avoiding injection of non-dystonic muscles, neighboring nerves and nearby vascular structures.       AREA/MUSCLE INJECTED: 90 UNITS BOTOX = TOTAL DOSE, 1:1 DILUTION  1. JAW MUSCLES: 90 UNITS BOTOX = TOTAL DOSE  Left Lateral Pterygoid - 90 units of Botox at 1 site/s.    RESPONSE TO PROCEDURE  Jc Navarrete tolerated the procedure well and there were no immediate complications. He was allowed to recover for an appropriate period of time and was discharged home in stable condition.    ASSESSMENT AND PLAN   Botulinum toxin injections: The Botox dose was increased from 50 units to 90 units in an effort to enhance its effectiveness and extend the duration of its benefits. The patient will monitor his response  to the injections and was instructed to report via [facility] before his next appointment if the results remain minimal. If this occurs, it will suggest the need for a higher dose. Currently, the patient is approved for up to 100 units of Botox, so with today s administration of 90 units, the next dose request will need to be for up to 200 units of Botox.  Referrals: None.   Follow up: Jc Navarrete was rescheduled for the next series of injections in 12 weeks, at which time we will evaluate response to today's injections. He may call the clinic prior with any questions or concerns prior to the next appointment.       Johanna Lopez MD       Again, thank you for allowing me to participate in the care of your patient.      Sincerely,    Johanna Lopez MD

## 2024-11-13 NOTE — PROGRESS NOTES
[facility] CLINICS & SURGERY CENTER    PM&R CLINIC NOTE  BOTULINUM TOXIN PROCEDURE      HPI  No chief complaint on file.    Jc Navarrete is a 49 year old male with a history of Tourette's syndrome and involuntary facial/jaw movements who presents to clinic for botulinum toxin injections for management of the involuntary facial/jaw movements.     SINCE LAST VISIT  Jc Navarrete was last seen here in clinic on 8/21/2024, at which time he received 50 units of Botox, which was an increase from previous dose of 30 units of Botox.    Patient denies new medical diagnoses, illnesses, hospitalizations, emergency room visits, and injuries since the previous injection with botulinum neurotoxin.     He has been taking clonazepam 0.5 mg bid (once in the morning and once at 1 pm), and this seems to help take the edge off of the jaw muscle tightness. He reports that at one point, he was on up to 3 mg of clonazepam per day, but eventually went on it, and only recently started back up on the medication with good effect. He would like to try to keep the dose of this medication as low as possible. He can feel the effects of the clonazepam when he takes it, and finds that it helps. He does not have a history of dependence or tolerance to this medication.     RESPONSE TO PREVIOUS TREATMENT    Side effects:  He noticed fatigue with chewing after about 3 weeks, and then this went on for a few weeks and then resolved.    Pain Improvement: He only endorses pain on the right side of his jaw, localized to the right masseter and lateral pterygoid region. The pain seems to be worse with prolonged periods of talking, as that side has to compensate for the involuntary shifting of the mandible to the right side.     Dystonia Improvement: He believes there was about 30% reduction in the involuntary shifting of his mandible to the right, but the effects were short-lived.       PHYSICAL  EXAM  VS: There were no vitals taken for this visit.   GEN: Pleasant and cooperative, in no acute distress  HEENT: There is involuntary shearing of the mandible to the right, with palpable tension in the right masseter and right lateral pterygoid (likely compensatory).  Front incisors have lost the normal curvature, and there is a small chip on the left front incisor.    ALLERGIES  Allergies   Allergen Reactions    Doxycycline Rash       CURRENT MEDICATIONS    Current Outpatient Medications:     clonazePAM (KLONOPIN) 1 MG tablet, Take 2 mg by mouth at bedtime, Disp: , Rfl:     Current Facility-Administered Medications:     botulinum toxin type A (BOTOX) 100 units injection 100 Units, 100 Units, Intramuscular, Q12 weeks, , 50 Units at 24 1455       BOTULINUM NEUROTOXIN INJECTION PROCEDURES    VERIFICATION OF PATIENT IDENTIFICATION AND PROCEDURE     Initials   Patient Name SES   Patient  SES   Procedure Verified by: RKI     Prior to the start of the procedure and with procedural staff participation, I verbally confirmed the patient s identity using two indicators, relevant allergies, that the procedure was appropriate and matched the consent or emergent situation, and that the correct equipment/implants were available. Immediately prior to starting the procedure I conducted the Time Out with the procedural staff and re-confirmed the patient s name, procedure, and site/side. 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 His baseline symptoms have been recalcitrant to oral medications and conservative therapy.  He is here today for injection with Botox.    GOAL OF PROCEDURE  The goal of this procedure is to increase active range of  motion, improve volitional motor control, and decrease pain.      TOTAL DOSE ADMINISTERED  Dose Administered:  90 units  Botox (Botulinum Toxin Type A)    Unavoidable Drug Waste: Yes  Amount of drug waste (mL): 10 units Botox.  Reason for waste:  Single use vial  Diluent Used:  Preservative Free Normal Saline  Total Volume of Diluent Used:  0.5 ml (100 units diluted into 0.5 ml)  NDC #: Botox 100u ([id number])      CONSENT  The risks, benefits, and treatment options were discussed with Jc Navarrete and he agreed to proceed.    Written consent was obtained by [facility].     EQUIPMENT USED  Needle-25mm stimulating/recording  EMG/NCS Machine    SKIN PREPARATION  Skin preparation was performed using an alcohol wipe.    GUIDANCE DESCRIPTION  Electro-myographic guidance was necessary throughout the procedure to accurately identify all areas of dystonic muscles while avoiding injection of non-dystonic muscles, neighboring nerves and nearby vascular structures.       AREA/MUSCLE INJECTED: 90 UNITS BOTOX = TOTAL DOSE, 1:1 DILUTION  1. JAW MUSCLES: 90 UNITS BOTOX = TOTAL DOSE  Left Lateral Pterygoid - 90 units of Botox at 1 site/s.    RESPONSE TO PROCEDURE  Jc Navarrete tolerated the procedure well and there were no immediate complications. He was allowed to recover for an appropriate period of time and was discharged home in stable condition.    ASSESSMENT AND PLAN   Botulinum toxin injections: The Botox dose was increased from 50 units to 90 units in an effort to enhance its effectiveness and extend the duration of its benefits. The patient will monitor his response to the injections and was instructed to [facility] before his next appointment if the results remain minimal. If this occurs, it will suggest the need for a higher dose. Currently, the patient is approved for up to 100 units of Botox, so with today s administration of 90 units, the next dose request will need to be for up to 200 units of  Botox.  Referrals: None.   Follow up: Jc Navarrete was rescheduled for the next series of injections in 12 weeks, at which time we will evaluate response to today's injections. He may call the clinic prior with any questions or concerns prior to the next appointment.       Johanna Lopez MD

## 2024-12-16 DIAGNOSIS — G24.4 IDIOPATHIC OROFACIAL DYSTONIA: Primary | ICD-10-CM

## 2025-02-05 ENCOUNTER — OFFICE VISIT (OUTPATIENT)
Dept: PHYSICAL MEDICINE AND REHAB | Facility: CLINIC | Age: 50
End: 2025-02-05
Payer: COMMERCIAL

## 2025-02-05 DIAGNOSIS — G24.4 IDIOPATHIC OROFACIAL DYSTONIA: Primary | ICD-10-CM

## 2025-02-05 PROCEDURE — 95874 GUIDE NERV DESTR NEEDLE EMG: CPT | Performed by: PHYSICAL MEDICINE & REHABILITATION

## 2025-02-05 PROCEDURE — 64612 DESTROY NERVE FACE MUSCLE: CPT | Mod: 50 | Performed by: PHYSICAL MEDICINE & REHABILITATION

## 2025-02-05 NOTE — PROGRESS NOTES
[facility] CLINICS & SURGERY CENTER    PM&R CLINIC NOTE  BOTULINUM TOXIN PROCEDURE      HPI  No chief complaint on file.    Jc Navarrete is a 49 year old male with a history of Tourette's syndrome and involuntary facial/jaw movements who presents to clinic for botulinum toxin injections for management of the involuntary facial/jaw movements.     SINCE LAST VISIT  Jc Navarrete was last seen here in clinic on 11/13/2024, at which time he received 90 units of Botox.     Patient denies new medical diagnoses, illnesses, hospitalizations, emergency room visits, and injuries since the previous injection with botulinum neurotoxin.     RESPONSE TO PREVIOUS TREATMENT    Side effects:  He noticed fatigue with chewing after about 3 weeks, and then this went on for a few weeks and then resolved.    Pain Improvement: He only endorses pain on the right side of his jaw, localized to the right masseter and lateral pterygoid region. The pain seems to be worse with prolonged periods of talking, as that side has to compensate for the involuntary shifting of the mandible to the right side.     Dystonia Improvement: He believes there was about 30% reduction in the involuntary shifting of his mandible to the right, which lasted a few weeks and then resolved.        PHYSICAL EXAM  VS: There were no vitals taken for this visit.   GEN: Pleasant and cooperative, in no acute distress  HEENT: There is involuntary shearing of the mandible to the right, with palpable tension in the right masseter and right lateral pterygoid (likely compensatory).  Front incisors have lost the normal curvature, and there is a small chip on the left front incisor.    ALLERGIES  Allergies   Allergen Reactions    Doxycycline Rash       CURRENT MEDICATIONS    Current Outpatient Medications:     clonazePAM (KLONOPIN) 1 MG tablet, Take 2 mg by mouth at bedtime, Disp: , Rfl:     Current  Facility-Administered Medications:     botulinum toxin type A (BOTOX) 100 units injection 200 Units, 200 Units, Intramuscular, Q12 weeks, , 90 Units at 25 0918       BOTULINUM NEUROTOXIN INJECTION PROCEDURES    VERIFICATION OF PATIENT IDENTIFICATION AND PROCEDURE     Initials   Patient Name SES   Patient  SES   Procedure Verified by: SES     Prior to the start of the procedure and with procedural staff participation, I verbally confirmed the patient s identity using two indicators, relevant allergies, that the procedure was appropriate and matched the consent or emergent situation, and that the correct equipment/implants were available. Immediately prior to starting the procedure I conducted the Time Out with the procedural staff and re-confirmed the patient s name, procedure, and site/side. 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 His baseline symptoms have been recalcitrant to oral medications and conservative therapy.  He is here today for injection with Botox.    GOAL OF PROCEDURE  The goal of this procedure is to increase active range of motion, improve volitional motor control, and decrease pain.      TOTAL DOSE ADMINISTERED  Dose Administered:  100 units  Botox (Botulinum Toxin Type A)    Unavoidable Drug Waste: No  Diluent Used:  Preservative Free Normal Saline  Total Volume of Diluent Used:  0.5 ml (100 units diluted into 0.5 ml)  NDC #: Botox 100u ([address]-40)      CONSENT  The risks, benefits, and treatment options were discussed with Jc Navarrete and he agreed to proceed.    Written consent was obtained by [facility].     EQUIPMENT USED  Needle-25mm stimulating/recording  EMG/NCS Machine    SKIN PREPARATION  Skin  preparation was performed using an alcohol wipe.    GUIDANCE DESCRIPTION  Electro-myographic guidance was necessary throughout the procedure to accurately identify all areas of dystonic muscles while avoiding injection of non-dystonic muscles, neighboring nerves and nearby vascular structures.       AREA/MUSCLE INJECTED: 100 UNITS BOTOX = TOTAL DOSE, 1:1 DILUTION  1. JAW MUSCLES: 100 UNITS BOTOX = TOTAL DOSE  Right Lateral Pterygoid - 10 units of Botox at 1 site/s.   Left Lateral Pterygoid - 60 units of Botox at 1 site/s.     Right Masseter - 30 units of Botox at 1 site/s.     RESPONSE TO PROCEDURE  Jc Navarrete tolerated the procedure well and there were no immediate complications. He was allowed to recover for an appropriate period of time and was discharged home in stable condition.    ASSESSMENT AND PLAN   Botulinum toxin injections: The dose was slightly increased from 90 to 100 units, with adjustments made to the distribution based on the patient's symptoms. While I believe the left lateral pterygoid is the primary contributor to his dystonia, he reported fatigue in that area, along with increased tension and pain in the right lateral pterygoid and masseter. As a result, I chose to adjust the injection distribution to include these muscles on the right side. He will monitor his response and send me a MyChart message regarding his progress after 1-2 months. This will help guide future injection decisions.Referrals: None.   Medications: No changes.   Referrals: None.   Follow up: Jc Navarrete was rescheduled for the next series of injections in 12 weeks, at which time we will evaluate response to today's injections. He may call the clinic prior with any questions or concerns prior to the next appointment.       Johanna Lopez MD

## 2025-02-05 NOTE — LETTER
2/5/2025       RE: Jc Navarrete  [id number] [facility] [id number]     Dear Colleague,    Thank you for referring your patient, Jc Navarrete, to the [facility] PHYSICAL MEDICINE AND REHABILITATION CLINIC [place] at [facility]. Please see a copy of my visit note below.      [facility] - CLINICS & SURGERY CENTER    PM&R CLINIC NOTE  BOTULINUM TOXIN PROCEDURE      HPI  No chief complaint on file.    Jc Navarrete is a 49 year old male with a history of Tourette's syndrome and involuntary facial/jaw movements who presents to clinic for botulinum toxin injections for management of the involuntary facial/jaw movements.     SINCE LAST VISIT  Jc Navarrete was last seen here in clinic on 11/13/2024, at which time he received 90 units of Botox.     Patient denies new medical diagnoses, illnesses, hospitalizations, emergency room visits, and injuries since the previous injection with botulinum neurotoxin.     RESPONSE TO PREVIOUS TREATMENT    Side effects:  He noticed fatigue with chewing after about 3 weeks, and then this went on for a few weeks and then resolved.    Pain Improvement: He only endorses pain on the right side of his jaw, localized to the right masseter and lateral pterygoid region. The pain seems to be worse with prolonged periods of talking, as that side has to compensate for the involuntary shifting of the mandible to the right side.     Dystonia Improvement: He believes there was about 30% reduction in the involuntary shifting of his mandible to the right, which lasted a few weeks and then resolved.        PHYSICAL EXAM  VS: There were no vitals taken for this visit.   GEN: Pleasant and cooperative, in no acute distress  HEENT: There is involuntary shearing of the mandible to the right, with palpable tension in the right masseter and right lateral pterygoid (likely compensatory).   Front incisors have lost the normal curvature, and there is a small chip on the left front incisor.    ALLERGIES  Allergies   Allergen Reactions     Doxycycline Rash       CURRENT MEDICATIONS    Current Outpatient Medications:      clonazePAM (KLONOPIN) 1 MG tablet, Take 2 mg by mouth at bedtime, Disp: , Rfl:     Current Facility-Administered Medications:      botulinum toxin type A (BOTOX) 100 units injection 200 Units, 200 Units, Intramuscular, Q12 weeks, , 90 Units at 25 0918       BOTULINUM NEUROTOXIN INJECTION PROCEDURES    VERIFICATION OF PATIENT IDENTIFICATION AND PROCEDURE     Initials   Patient Name SES   Patient  SES   Procedure Verified by: SES     Prior to the start of the procedure and with procedural staff participation, I verbally confirmed the patient s identity using two indicators, relevant allergies, that the procedure was appropriate and matched the consent or emergent situation, and that the correct equipment/implants were available. Immediately prior to starting the procedure I conducted the Time Out with the procedural staff and re-confirmed the patient s name, procedure, and site/side. 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 His baseline symptoms have been recalcitrant to oral medications and conservative therapy.  He is here today for injection with Botox.    GOAL OF PROCEDURE  The goal of this procedure is to increase active range of motion, improve volitional motor control, and decrease pain.      TOTAL DOSE ADMINISTERED  Dose Administered:  100 units  Botox (Botulinum Toxin Type A)    Unavoidable Drug Waste: No  Diluent Used:  Preservative Free Normal Saline  Total Volume of Diluent Used:  0.5 ml  (100 units diluted into 0.5 ml)  NDC #: Botox 100u ([address]-27)      CONSENT  The risks, benefits, and treatment options were discussed with Jc Navarrete and he agreed to proceed.    Written consent was obtained by [facility].     EQUIPMENT USED  Needle-25mm stimulating/recording  EMG/NCS Machine    SKIN PREPARATION  Skin preparation was performed using an alcohol wipe.    GUIDANCE DESCRIPTION  Electro-myographic guidance was necessary throughout the procedure to accurately identify all areas of dystonic muscles while avoiding injection of non-dystonic muscles, neighboring nerves and nearby vascular structures.       AREA/MUSCLE INJECTED: 100 UNITS BOTOX = TOTAL DOSE, 1:1 DILUTION  1. JAW MUSCLES: 100 UNITS BOTOX = TOTAL DOSE  Right Lateral Pterygoid - 10 units of Botox at 1 site/s.   Left Lateral Pterygoid - 60 units of Botox at 1 site/s.     Right Masseter - 30 units of Botox at 1 site/s.     RESPONSE TO PROCEDURE  Jc Navarrete tolerated the procedure well and there were no immediate complications. He was allowed to recover for an appropriate period of time and was discharged home in stable condition.    ASSESSMENT AND PLAN   Botulinum toxin injections: The dose was slightly increased from 90 to 100 units, with adjustments made to the distribution based on the patient's symptoms. While I believe the left lateral pterygoid is the primary contributor to his dystonia, he reported fatigue in that area, along with increased tension and pain in the right lateral pterygoid and masseter. As a result, I chose to adjust the injection distribution to include these muscles on the right side. He will monitor his response and send me a MyChart message regarding his progress after 1-2 months. This will help guide future injection decisions.Referrals: None.   Medications: No changes.   Referrals: None.   Follow up: Jc Navarrete was rescheduled for the next series of injections in 12 weeks, at which time we will evaluate  response to today's injections. He may call the clinic prior with any questions or concerns prior to the next appointment.       Johanna Lopez MD       Again, thank you for allowing me to participate in the care of your patient.      Sincerely,    Johanna Lopez MD

## 2025-05-10 ENCOUNTER — HEALTH MAINTENANCE LETTER (OUTPATIENT)
Age: 50
End: 2025-05-10